# Patient Record
Sex: FEMALE | Race: WHITE
[De-identification: names, ages, dates, MRNs, and addresses within clinical notes are randomized per-mention and may not be internally consistent; named-entity substitution may affect disease eponyms.]

---

## 2017-08-12 ENCOUNTER — HOSPITAL ENCOUNTER (EMERGENCY)
Dept: HOSPITAL 11 - JP.ED | Age: 27
Discharge: NURSING, REHAB OR LONG-TERM CARE | End: 2017-08-12
Payer: COMMERCIAL

## 2017-08-12 VITALS — DIASTOLIC BLOOD PRESSURE: 68 MMHG | SYSTOLIC BLOOD PRESSURE: 115 MMHG
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
## 2017-08-12 DIAGNOSIS — S80.01XA: ICD-10-CM

## 2017-08-12 DIAGNOSIS — S02.119A: ICD-10-CM

## 2017-08-12 DIAGNOSIS — S06.5X1A: Primary | ICD-10-CM

## 2017-08-12 PROCEDURE — 96375 TX/PRO/DX INJ NEW DRUG ADDON: CPT

## 2017-08-12 PROCEDURE — 84703 CHORIONIC GONADOTROPIN ASSAY: CPT

## 2017-08-12 PROCEDURE — 72125 CT NECK SPINE W/O DYE: CPT

## 2017-08-12 PROCEDURE — 87086 URINE CULTURE/COLONY COUNT: CPT

## 2017-08-12 PROCEDURE — 71250 CT THORAX DX C-: CPT

## 2017-08-12 PROCEDURE — 36415 COLL VENOUS BLD VENIPUNCTURE: CPT

## 2017-08-12 PROCEDURE — 99285 EMERGENCY DEPT VISIT HI MDM: CPT

## 2017-08-12 PROCEDURE — 73030 X-RAY EXAM OF SHOULDER: CPT

## 2017-08-12 PROCEDURE — 85025 COMPLETE CBC W/AUTO DIFF WBC: CPT

## 2017-08-12 PROCEDURE — 81001 URINALYSIS AUTO W/SCOPE: CPT

## 2017-08-12 PROCEDURE — 70450 CT HEAD/BRAIN W/O DYE: CPT

## 2017-08-12 PROCEDURE — 96374 THER/PROPH/DIAG INJ IV PUSH: CPT

## 2017-08-12 PROCEDURE — 73564 X-RAY EXAM KNEE 4 OR MORE: CPT

## 2017-08-12 PROCEDURE — 80053 COMPREHEN METABOLIC PANEL: CPT
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
## 2017-08-14 NOTE — CR
Knee Min 4V Rt

 

INDICATION:    pain in rt knee. 

 

COMPARISON:    None

 

FINDINGS:   4 views.  No fracture, dislocation, or other acute bony abnormality.  No joint space johny
rowing.

 

IMPRESSION: Negative study.

## 2017-08-14 NOTE — CR
Shoulder Comp Lt

 

INDICATION:    pain in left clavicle area. 

 

COMPARISON:    None

 

FINDINGS:   3 views.  No fracture, dislocation, or other acute bony abnormality.  No joint space johny
rowing.

 

IMPRESSION: Negative study.

## 2019-01-18 ENCOUNTER — HOSPITAL ENCOUNTER (INPATIENT)
Dept: HOSPITAL 11 - JP.MS | Age: 29
LOS: 2 days | Discharge: HOME | End: 2019-01-20
Attending: ADVANCED PRACTICE MIDWIFE | Admitting: ADVANCED PRACTICE MIDWIFE
Payer: MEDICAID

## 2019-01-18 DIAGNOSIS — Z3A.38: ICD-10-CM

## 2019-01-18 NOTE — PCM.LDHP
L&D History of Present Illness





- General


Date of Service: 19


Admit Problem/Dx: 


 Patient Status Order with Admit Dx/Problem





19 19:16


Admission Diagnosis [ADT] Routine 





19 21:27


Patient Status [ADT] Routine 








 Admission Diagnosis/Problem











Admission Diagnosis/Problem    Pregnancy

















- Related Data


Allergies/Adverse Reactions: 


 Allergies











Allergy/AdvReac Type Severity Reaction Status Date / Time


 


No Known Allergies Allergy   Verified 16 15:07











Home Medications: 


 Home Meds





Omeprazole Magnesium 20 mg PO DAILY 19 [History]


PNV95/Ferrous Fumarate/FA [Prenatal Tablet] 1 tab DAILY 19 [History]











Past Medical History





- Past Health History


Medical/Surgical History: Denies Medical/Surgical History


HEENT History: Reports: None


Cardiovascular History: Reports: None


Respiratory History: Reports: None


Gastrointestinal History: Reports: None


Genitourinary History: Reports: None


OB/GYN History: Reports: Pregnancy


Musculoskeletal History: Reports: Fracture


Other Musculoskeletal History: foot


Neurological History: Reports: Concussion


Psychiatric History: Reports: None


Endocrine/Metabolic History: Reports: None


Hematologic History: Reports: None


Immunologic History: Reports: None


Oncologic (Cancer) History: Reports: None


Dermatologic History: Reports: None





- Past Surgical History


Head Surgeries/Procedures: Reports: None





Social & Family History





- Family History


OBGYN: Reports: Other (See Below)


Other OBGYN Family History: Mother had cervical cancer





- Tobacco Use


Smoking Status *Q: Never Smoker


Second Hand Smoke Exposure: No





- Caffeine Use


Caffeine Use: Reports: None





- Recreational Drug Use


Recreational Drug Use: No





H&P Review of Systems





- Review of Systems:


Review Of Systems: See Below


General: Reports: No Symptoms


HEENT: Reports: No Symptoms


Pulmonary: Reports: No Symptoms


Cardiovascular: Reports: No Symptoms


Gastrointestinal: Reports: No Symptoms


Genitourinary: Reports: No Symptoms


Musculoskeletal: Reports: No Symptoms


Skin: Reports: No Symptoms


Psychiatric: Reports: No Symptoms


Neurological: Reports: No Symptoms


Hematologic/Lymphatic: Reports: No Symptoms


Immunologic: Reports: No Symptoms





L&D Exam





- Exam


Exam: See Below





- Vital Signs


Vital Signs: 


 Last Vital Signs











Temp  36.7 C   19 21:27


 


Pulse  80   19 21:27


 


Resp  18   19 21:27


 


BP  122/79   19 21:27


 


Pulse Ox      











Weight: 99.79 kg





- OB Specific


Contraction Frequency (min): none


Fetal Heart Rate (FHR) Variability: Moderate (6-25 bmp)


Birth Presentation: Vertex





- Patient Data


Lab Results Last 24 hrs: 


 Laboratory Results - last 24 hr











  19 Range/Units





  19:15 19:15 21:29 


 


WBC    9.9  (4.5-11.0)  K/uL


 


RBC    4.32  (3.30-5.50)  M/uL


 


Hgb    12.6  (12.0-15.0)  g/dL


 


Hct    38.5  (36.0-48.0)  %


 


MCV    89  (80-98)  fL


 


MCH    29  (27-31)  pg


 


MCHC    33  (32-36)  %


 


Plt Count    197  (150-400)  K/uL


 


Neut % (Auto)    66  (36-66)  %


 


Lymph % (Auto)    21 L  (24-44)  %


 


Mono % (Auto)    12 H  (2-6)  %


 


Eos % (Auto)    1 L  (2-4)  %


 


Baso % (Auto)    0  (0-1)  %


 


Sodium     (140-148)  mmol/L


 


Potassium     (3.6-5.2)  mmol/L


 


Chloride     (100-108)  mmol/L


 


Carbon Dioxide     (21-32)  mmol/L


 


Anion Gap     (5.0-14.0)  mmol/L


 


BUN     (7-18)  mg/dL


 


Creatinine     (0.6-1.0)  mg/dL


 


Est Cr Clr Drug Dosing     mL/min


 


Estimated GFR (MDRD)     (>60)  


 


Glucose     ()  mg/dL


 


Uric Acid     (2.6-6.2)  mg/dL


 


Calcium     (8.5-10.1)  mg/dL


 


Total Bilirubin     (0.2-1.0)  mg/dL


 


AST     (15-37)  U/L


 


ALT     (12-78)  U/L


 


Alkaline Phosphatase     ()  U/L


 


Lactate Dehydrogenase     ()  U/L


 


Total Protein     (6.4-8.2)  g/dL


 


Albumin     (3.4-5.0)  g/dL


 


Globulin     (2.3-3.5)  g/dL


 


Albumin/Globulin Ratio     (1.2-2.2)  


 


Urine Color  Yellow    


 


Urine Appearance  Slightly cloudy    


 


Urine pH  7.0    (4.5-8.0)  


 


Ur Specific Gravity  1.020    (1.008-1.030)  


 


Urine Protein  Trace    (NEGATIVE)  mg/dL


 


Urine Glucose (UA)  Normal    (NEGATIVE)  mg/dL


 


Urine Ketones  Negative    (NEGATIVE)  mg/dL


 


Urine Occult Blood  Moderate    (NEGATIVE)  


 


Urine Nitrite  Negative    (NEGATIVE)  


 


Urine Bilirubin  Negative    (NEGATIVE)  


 


Urine Urobilinogen  Normal    (NORMAL)  mg/dL


 


Ur Leukocyte Esterase  Moderate    (NEGATIVE)  


 


Urine RBC  10-20 H    (0-5)  


 


Urine WBC  20-30 H    (0-5)  


 


Ur Epithelial Cells  Many    


 


Amorphous Sediment  Not seen    


 


Urine Bacteria  Many    


 


Urine Mucus  Not seen    


 


Urine Opiates Screen   Negative   (NEGATIVE)  


 


Ur Oxycodone Screen   Negative   (NEGATIVE)  


 


Urine Methadone Screen   Negative   (NEGATIVE)  


 


Ur Propoxyphene Screen   Negative   (NEGATIVE)  


 


Ur Barbiturates Screen   Negative   (NEGATIVE)  


 


Ur Tricyclics Screen   Negative   (NEGATIVE)  


 


Ur Phencyclidine Scrn   Negative   (NEGATIVE)  


 


Ur Amphetamine Screen   Negative   (NEGATIVE)  


 


U Methamphetamines Scrn   Negative   (NEGATIVE)  


 


Urine MDMA Screen   Negative   (NEGATIVE)  


 


U Benzodiazepines Scrn   Negative   (NEGATIVE)  


 


U Cocaine Metab Screen   Negative   (NEGATIVE)  


 


U Marijuana (THC) Screen   Negative   (NEGATIVE)  














  19 Range/Units





  21:29 21:29 


 


WBC    (4.5-11.0)  K/uL


 


RBC    (3.30-5.50)  M/uL


 


Hgb    (12.0-15.0)  g/dL


 


Hct    (36.0-48.0)  %


 


MCV    (80-98)  fL


 


MCH    (27-31)  pg


 


MCHC    (32-36)  %


 


Plt Count    (150-400)  K/uL


 


Neut % (Auto)    (36-66)  %


 


Lymph % (Auto)    (24-44)  %


 


Mono % (Auto)    (2-6)  %


 


Eos % (Auto)    (2-4)  %


 


Baso % (Auto)    (0-1)  %


 


Sodium  137 L   (140-148)  mmol/L


 


Potassium  4.1   (3.6-5.2)  mmol/L


 


Chloride  104   (100-108)  mmol/L


 


Carbon Dioxide  23   (21-32)  mmol/L


 


Anion Gap  14.1 H   (5.0-14.0)  mmol/L


 


BUN  13   (7-18)  mg/dL


 


Creatinine  0.7   (0.6-1.0)  mg/dL


 


Est Cr Clr Drug Dosing  117.39   mL/min


 


Estimated GFR (MDRD)  > 60   (>60)  


 


Glucose  89   ()  mg/dL


 


Uric Acid   5.2  (2.6-6.2)  mg/dL


 


Calcium  9.2   (8.5-10.1)  mg/dL


 


Total Bilirubin  0.2   (0.2-1.0)  mg/dL


 


AST  16   (15-37)  U/L


 


ALT  20   (12-78)  U/L


 


Alkaline Phosphatase  142 H   ()  U/L


 


Lactate Dehydrogenase  136   ()  U/L


 


Total Protein  6.4   (6.4-8.2)  g/dL


 


Albumin  2.0 L   (3.4-5.0)  g/dL


 


Globulin  4.4 H   (2.3-3.5)  g/dL


 


Albumin/Globulin Ratio  0.5 L   (1.2-2.2)  


 


Urine Color    


 


Urine Appearance    


 


Urine pH    (4.5-8.0)  


 


Ur Specific Gravity    (1.008-1.030)  


 


Urine Protein    (NEGATIVE)  mg/dL


 


Urine Glucose (UA)    (NEGATIVE)  mg/dL


 


Urine Ketones    (NEGATIVE)  mg/dL


 


Urine Occult Blood    (NEGATIVE)  


 


Urine Nitrite    (NEGATIVE)  


 


Urine Bilirubin    (NEGATIVE)  


 


Urine Urobilinogen    (NORMAL)  mg/dL


 


Ur Leukocyte Esterase    (NEGATIVE)  


 


Urine RBC    (0-5)  


 


Urine WBC    (0-5)  


 


Ur Epithelial Cells    


 


Amorphous Sediment    


 


Urine Bacteria    


 


Urine Mucus    


 


Urine Opiates Screen    (NEGATIVE)  


 


Ur Oxycodone Screen    (NEGATIVE)  


 


Urine Methadone Screen    (NEGATIVE)  


 


Ur Propoxyphene Screen    (NEGATIVE)  


 


Ur Barbiturates Screen    (NEGATIVE)  


 


Ur Tricyclics Screen    (NEGATIVE)  


 


Ur Phencyclidine Scrn    (NEGATIVE)  


 


Ur Amphetamine Screen    (NEGATIVE)  


 


U Methamphetamines Scrn    (NEGATIVE)  


 


Urine MDMA Screen    (NEGATIVE)  


 


U Benzodiazepines Scrn    (NEGATIVE)  


 


U Cocaine Metab Screen    (NEGATIVE)  


 


U Marijuana (THC) Screen    (NEGATIVE)  











Result Diagrams: 


 19 21:29





 19 21:29





- Problem List


(1) Pregnancy


SNOMED Code(s): 73727794


   ICD Code: Z34.90 - ENCNTR FOR SUPRVSN OF NORMAL PREGNANCY, UNSP, UNSP 

TRIMESTER   Status: Acute   Current Visit: Yes   


Qualifiers: 


   Weeks of gestation: 38 weeks   Qualified Code(s): Z3A.38 - 38 weeks 

gestation of pregnancy   





(2) Preeclampsia


SNOMED Code(s): 411976404


   ICD Code: O14.90 - UNSPECIFIED PRE-ECLAMPSIA, UNSPECIFIED TRIMESTER   Status

: Acute   Current Visit: Yes   


Qualifiers: 


   Trimester: third trimester   Qualified Code(s): O14.93 - Unspecified pre-

eclampsia, third trimester   





(3) Proteinuria


SNOMED Code(s): 96955406


   ICD Code: R80.9 - PROTEINURIA, UNSPECIFIED   Status: Acute   Current Visit: 

Yes   


Qualifiers: 


   Proteinuria type: gestational   Trimester: third trimester   Qualified Code(s

): O12.13 - Gestational proteinuria, third trimester   





(4) Encounter for induction of labor


SNOMED Code(s): 853919971


   ICD Code: Z34.90 - ENCNTR FOR SUPRVSN OF NORMAL PREGNANCY, UNSP, UNSP 

TRIMESTER   Status: Acute   Current Visit: Yes   


Problem List Initiated/Reviewed/Updated: Yes


Orders Last 24hrs: 


 Active Orders 24 hr











 Category Date Time Status


 


 Admission Diagnosis [ADT] Routine ADT  19 19:16 Ordered


 


 Patient Status [ADT] Routine ADT  19 21:27 Active


 


 Ambulate [RC] PER UNIT ROUTINE Care  19 21:29 Active


 


 Communication Order [RC] ASDIRECTED Care  19 21:27 Active


 


 Communication Order [RC] ASDIRECTED Care  19 21:29 Active


 


 Notify Provider Vital Signs [RC] PRN Care  19 21:29 Active


 


 Notify Provider [RC] PRN Care  19 21:27 Active


 


 Up ad Katrin [RC] ASDIRECTED Care  19 21:26 Active


 


 VTE/DVT Education [RC] Click to Edit Care  19 21:30 Active


 


 Vital Signs [RC] PER UNIT ROUTINE Care  19 21:27 Active


 


 Regular Diet [DIET] Diet  19 Dinner Active


 


 Acetaminophen [Tylenol] Med  19 21:29 Active





 650 mg PO Q4H PRN   


 


 Lactated Ringers [Ringers, Lactated] 1,000 ml Med  19 21:45 Active





 IV ASDIRECTED   


 


 Ondansetron [Zofran] Med  19 21:29 Active





 4 mg IV Q4H PRN   


 


 Sodium Chloride 0.9% [Saline Flush] Med  19 21:26 Active





 10 ml FLUSH ASDIRECTED PRN   


 


 fentaNYL [Sublimaze] Med  19 21:29 Active





 100 mcg IVPUSH Q1H PRN   


 


 DVT/VTE Prophylaxis Reflex [OM.PC] Routine Oth  19 21:29 Ordered


 


 Saline Lock Insert [OM.PC] Routine Oth  19 21:27 Ordered


 


 Saline Lock Insert [OM.PC] Routine Oth  19 21:29 Ordered


 


 Resuscitation Status Routine Resus Stat  19 21:26 Ordered








 Medication Orders





Acetaminophen (Tylenol)  650 mg PO Q4H PRN


   PRN Reason: Pain (Mild 1-3) and fever


Fentanyl (Sublimaze)  100 mcg IVPUSH Q1H PRN


   PRN Reason: Pain (moderate 4-6)


Lactated Ringer's (Ringers, Lactated)  1,000 mls @ 125 mls/hr IV ASDIRECTED DAVEY


Ondansetron HCl (Zofran)  4 mg IV Q4H PRN


   PRN Reason: Nausea/Vomiting


Sodium Chloride (Saline Flush)  10 ml FLUSH ASDIRECTED PRN


   PRN Reason: Keep Vein Open








Assessment/Plan Comment:: 





2019


29 yo  here at 38 5/7 for a medical induction due to preelampsia


SVE-1/50/-2


FHTs category one


No contractions noted 


Cytotec 50mcg vaginally





Plan-


Monitor for active labor


Monitor FHTs


CBC, CMP, LDH, uric acid today


Pain medication per patient request


Plan and anticipate vaginal delivery


Ambien 10mg 


Intermittent monitoring

## 2019-01-19 PROCEDURE — 3E033VJ INTRODUCTION OF OTHER HORMONE INTO PERIPHERAL VEIN, PERCUTANEOUS APPROACH: ICD-10-PCS

## 2019-01-19 PROCEDURE — 3E0P7VZ INTRODUCTION OF HORMONE INTO FEMALE REPRODUCTIVE, VIA NATURAL OR ARTIFICIAL OPENING: ICD-10-PCS | Performed by: ADVANCED PRACTICE MIDWIFE
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
## 2019-01-19 NOTE — PCM.DEL
L & D Note





- General Info


Date of Service: 19





- Delivery Note


Cervical Ripening Method: Misoprostil


Delivery Outcome: Livebirth


Infant Delivery Method: Spontaneous Vaginal Delivery-Single


Infant Delivery Mode: Spontaneous


Birth Presentation: Vertex


Nuchal Cord: None


Anesthesia Type: Epidural


Episiotomy Type: None


Laceration: None


Placenta: Intact, Spontaneous


Cord: 3 Vessels


Estimated Blood Loss: 250


Resuscitation Needed: No


[place]: Bulb Syringe, Stimulated, Warmed, [place] Used


Second Stage Interventions: Reports: Encouragement Given, Pushing Effectively, 

Pushing, McRobert's Position


Delivery Comments (Free Text/Narrative):: 





2019


29 yo  delivered a viable male infant at 38 6/7 gestational weeks at 0719 

on 2019 in JANEEN position over an intact perineum.  Infant delivered and 

placed on prewarmed blanket on mother's abdomen, cord then double clamped after 

delayed cord clamping and cut by father of infant.  Infant dried, stimulated, 

and warmed.  Infant began to cry vigorously and pink in color.  APGARS-9/9, 

weight-7lbs 10.4oz, length-19.8 inches, Placenta spontaneous, EBL-250ml, 3 

vessel cord.  No lacerations noted of the cervix, vagina, perineum, or rectum.  

Infant now skin to skin with mother and both stable.





Stages-


[id number]


[id number]


[id number]





- General Info


Date of Service: 19


Functional Status: Reports: Pain Controlled





- Review of Systems


General: Reports: No Symptoms


HEENT: Reports: No Symptoms


Pulmonary: Reports: No Symptoms


Cardiovascular: Reports: No Symptoms


Gastrointestinal: Reports: No Symptoms


Genitourinary: Reports: No Symptoms


Musculoskeletal: Reports: No Symptoms


Skin: Reports: No Symptoms


Neurological: Reports: No Symptoms


Psychiatric: Reports: No Symptoms





- Patient Data


Vitals - Most Recent: 


 Last Vital Signs











Temp  37.1 C   19 06:30


 


Pulse  78   19 06:45


 


Resp  20   19 06:45


 


BP  142/78 H  19 06:45


 


Pulse Ox  97   19 06:45











Weight - Most Recent: 99.79 kg


Lab Results Last 24 Hours: 


 Laboratory Results - last 24 hr











  19 Range/Units





  19:15 19:15 21:29 


 


WBC    9.9  (4.5-11.0)  K/uL


 


RBC    4.32  (3.30-5.50)  M/uL


 


Hgb    12.6  (12.0-15.0)  g/dL


 


Hct    38.5  (36.0-48.0)  %


 


MCV    89  (80-98)  fL


 


MCH    29  (27-31)  pg


 


MCHC    33  (32-36)  %


 


Plt Count    197  (150-400)  K/uL


 


Neut % (Auto)    66  (36-66)  %


 


Lymph % (Auto)    21 L  (24-44)  %


 


Mono % (Auto)    12 H  (2-6)  %


 


Eos % (Auto)    1 L  (2-4)  %


 


Baso % (Auto)    0  (0-1)  %


 


Sodium     (140-148)  mmol/L


 


Potassium     (3.6-5.2)  mmol/L


 


Chloride     (100-108)  mmol/L


 


Carbon Dioxide     (21-32)  mmol/L


 


Anion Gap     (5.0-14.0)  mmol/L


 


BUN     (7-18)  mg/dL


 


Creatinine     (0.6-1.0)  mg/dL


 


Est Cr Clr Drug Dosing     mL/min


 


Estimated GFR (MDRD)     (>60)  


 


Glucose     ()  mg/dL


 


Uric Acid     (2.6-6.2)  mg/dL


 


Calcium     (8.5-10.1)  mg/dL


 


Total Bilirubin     (0.2-1.0)  mg/dL


 


AST     (15-37)  U/L


 


ALT     (12-78)  U/L


 


Alkaline Phosphatase     ()  U/L


 


Lactate Dehydrogenase     ()  U/L


 


Total Protein     (6.4-8.2)  g/dL


 


Albumin     (3.4-5.0)  g/dL


 


Globulin     (2.3-3.5)  g/dL


 


Albumin/Globulin Ratio     (1.2-2.2)  


 


Urine Color  Yellow    


 


Urine Appearance  Slightly cloudy    


 


Urine pH  7.0    (4.5-8.0)  


 


Ur Specific Gravity  1.020    (1.008-1.030)  


 


Urine Protein  Trace    (NEGATIVE)  mg/dL


 


Urine Glucose (UA)  Normal    (NEGATIVE)  mg/dL


 


Urine Ketones  Negative    (NEGATIVE)  mg/dL


 


Urine Occult Blood  Moderate    (NEGATIVE)  


 


Urine Nitrite  Negative    (NEGATIVE)  


 


Urine Bilirubin  Negative    (NEGATIVE)  


 


Urine Urobilinogen  Normal    (NORMAL)  mg/dL


 


Ur Leukocyte Esterase  Moderate    (NEGATIVE)  


 


Urine RBC  10-20 H    (0-5)  


 


Urine WBC  20-30 H    (0-5)  


 


Ur Epithelial Cells  Many    


 


Amorphous Sediment  Not seen    


 


Urine Bacteria  Many    


 


Urine Mucus  Not seen    


 


Urine Opiates Screen   Negative   (NEGATIVE)  


 


Ur Oxycodone Screen   Negative   (NEGATIVE)  


 


Urine Methadone Screen   Negative   (NEGATIVE)  


 


Ur Propoxyphene Screen   Negative   (NEGATIVE)  


 


Ur Barbiturates Screen   Negative   (NEGATIVE)  


 


Ur Tricyclics Screen   Negative   (NEGATIVE)  


 


Ur Phencyclidine Scrn   Negative   (NEGATIVE)  


 


Ur Amphetamine Screen   Negative   (NEGATIVE)  


 


U Methamphetamines Scrn   Negative   (NEGATIVE)  


 


Urine MDMA Screen   Negative   (NEGATIVE)  


 


U Benzodiazepines Scrn   Negative   (NEGATIVE)  


 


U Cocaine Metab Screen   Negative   (NEGATIVE)  


 


U Marijuana (THC) Screen   Negative   (NEGATIVE)  














  19 Range/Units





  21:29 21:29 


 


WBC    (4.5-11.0)  K/uL


 


RBC    (3.30-5.50)  M/uL


 


Hgb    (12.0-15.0)  g/dL


 


Hct    (36.0-48.0)  %


 


MCV    (80-98)  fL


 


MCH    (27-31)  pg


 


MCHC    (32-36)  %


 


Plt Count    (150-400)  K/uL


 


Neut % (Auto)    (36-66)  %


 


Lymph % (Auto)    (24-44)  %


 


Mono % (Auto)    (2-6)  %


 


Eos % (Auto)    (2-4)  %


 


Baso % (Auto)    (0-1)  %


 


Sodium  137 L   (140-148)  mmol/L


 


Potassium  4.1   (3.6-5.2)  mmol/L


 


Chloride  104   (100-108)  mmol/L


 


Carbon Dioxide  23   (21-32)  mmol/L


 


Anion Gap  14.1 H   (5.0-14.0)  mmol/L


 


BUN  13   (7-18)  mg/dL


 


Creatinine  0.7   (0.6-1.0)  mg/dL


 


Est Cr Clr Drug Dosing  117.39   mL/min


 


Estimated GFR (MDRD)  > 60   (>60)  


 


Glucose  89   ()  mg/dL


 


Uric Acid   5.2  (2.6-6.2)  mg/dL


 


Calcium  9.2   (8.5-10.1)  mg/dL


 


Total Bilirubin  0.2   (0.2-1.0)  mg/dL


 


AST  16   (15-37)  U/L


 


ALT  20   (12-78)  U/L


 


Alkaline Phosphatase  142 H   ()  U/L


 


Lactate Dehydrogenase  136   ()  U/L


 


Total Protein  6.4   (6.4-8.2)  g/dL


 


Albumin  2.0 L   (3.4-5.0)  g/dL


 


Globulin  4.4 H   (2.3-3.5)  g/dL


 


Albumin/Globulin Ratio  0.5 L   (1.2-2.2)  


 


Urine Color    


 


Urine Appearance    


 


Urine pH    (4.5-8.0)  


 


Ur Specific Gravity    (1.008-1.030)  


 


Urine Protein    (NEGATIVE)  mg/dL


 


Urine Glucose (UA)    (NEGATIVE)  mg/dL


 


Urine Ketones    (NEGATIVE)  mg/dL


 


Urine Occult Blood    (NEGATIVE)  


 


Urine Nitrite    (NEGATIVE)  


 


Urine Bilirubin    (NEGATIVE)  


 


Urine Urobilinogen    (NORMAL)  mg/dL


 


Ur Leukocyte Esterase    (NEGATIVE)  


 


Urine RBC    (0-5)  


 


Urine WBC    (0-5)  


 


Ur Epithelial Cells    


 


Amorphous Sediment    


 


Urine Bacteria    


 


Urine Mucus    


 


Urine Opiates Screen    (NEGATIVE)  


 


Ur Oxycodone Screen    (NEGATIVE)  


 


Urine Methadone Screen    (NEGATIVE)  


 


Ur Propoxyphene Screen    (NEGATIVE)  


 


Ur Barbiturates Screen    (NEGATIVE)  


 


Ur Tricyclics Screen    (NEGATIVE)  


 


Ur Phencyclidine Scrn    (NEGATIVE)  


 


Ur Amphetamine Screen    (NEGATIVE)  


 


U Methamphetamines Scrn    (NEGATIVE)  


 


Urine MDMA Screen    (NEGATIVE)  


 


U Benzodiazepines Scrn    (NEGATIVE)  


 


U Cocaine Metab Screen    (NEGATIVE)  


 


U Marijuana (THC) Screen    (NEGATIVE)  











Med Orders - Current: 


 Current Medications





Acetaminophen (Tylenol)  650 mg PO Q4H PRN


   PRN Reason: Pain (Mild 1-3) and fever


Acetaminophen (Tylenol Bulk Bottle)  325 - 650 mg PO Q4H PRN


   PRN Reason: Pain


Benzocaine (Vxnq-D-Affkrtq 20% Spray)  0 gm TOP Q4H PRN


   PRN Reason: Perineal Comfort Measure


Docusate Sodium (Colace)  100 mg PO BID PRN


   PRN Reason: Constipation


Emollient Ointment (Lansinoh Hpa)  0 gm TOP ASDIRECTED PRN


   PRN Reason: Sore Nipples


Fentanyl (Sublimaze)  100 mcg IVPUSH Q1H PRN


   PRN Reason: Pain (moderate 4-6)


Lactated Ringer's (Ringers, Lactated)  1,000 mls @ 125 mls/hr IV ASDIRECTED DAVEY


   Last Admin: 19 05:38 Dose:  125 mls/hr


Ibuprofen (Motrin Bulk Bottle)  600 mg PO Q6H PRN


   PRN Reason: Pain


Ondansetron HCl (Zofran)  4 mg IV Q4H PRN


   PRN Reason: Nausea/Vomiting


Sodium Chloride (Saline Flush)  10 ml FLUSH ASDIRECTED PRN


   PRN Reason: Keep Vein Open


Witch Hazel (Tucks)  1 pad TOP ASDIRECTED PRN


   PRN Reason: Hemorrhoids





Discontinued Medications





Ephedrine Sulfate (Ephedrine Sulfate) Confirm Administered Dose 50 mg .ROUTE 

.STK-MED ONE


   Stop: 19 05:33


Lactated Ringer's (Ringers, Lactated)  1,000 mls @ 999 mls/hr IV BOLUS ONE


   Stop: 19 06:38


   Last Admin: 19 06:03 Dose:  999 mls/hr


Ropivacaine (Naropin 0.2%) Confirm Administered Dose 100 mls @ as directed 

.ROUTE .STK-MED ONE


   Stop: 19 06:43


Oxytocin/Sodium Chloride (Pitocin In Ns 20 Units/1,000 Ml)  20 unit in 1,000 

mls @ 2,997 mls/hr IV ONETIME ONE; Protocol


   Stop: 19 07:56


Misoprostol (Cytotec)  50 mcg VAG ONETIME ONE


   Stop: 19 21:22


   Last Admin: 19 21:47 Dose:  50 mcg


Zolpidem Tartrate (Ambien)  10 mg PO ONETIME ONE


   Stop: 19 21:32


   Last Admin: 19 02:14 Dose:  Not Given











- Exam


General: Alert, Oriented


HEENT: Pupils Equal, Pupils Reactive, EOMI, Mucous Membr. Moist/Pink


Neck: Supple


Lungs: Clear to Auscultation, Normal Respiratory Effort


Cardiovascular: Regular Rate, Regular Rhythm


GI/Abdominal Exam: Normal Bowel Sounds, Soft, Non-Tender, No Organomegaly, No 

Distention, No Abnormal Bruit, No Mass, Pelvis Stable


 (Female) Exam: Normal External Exam


Back Exam: Normal Inspection, Full Range of Motion


Extremities: Normal Inspection, Normal Range of Motion, Non-Tender, No Pedal 

Edema, Normal Capillary Refill


Skin: Warm, Dry, Intact


Neurological: No New Focal Deficit


Psy/Mental Status: Alert, Normal Affect, Normal Mood





- Problem List & Annotations


(1) Pregnancy


SNOMED Code(s): 41106396


   Code(s): Z34.90 - ENCNTR FOR SUPRVSN OF NORMAL PREGNANCY, UNSP, UNSP 

TRIMESTER   Status: Acute   Current Visit: Yes   


Qualifiers: 


   Weeks of gestation: 38 weeks   Qualified Code(s): Z3A.38 - 38 weeks 

gestation of pregnancy   





(2) Preeclampsia


SNOMED Code(s): 919484188


   Code(s): O14.90 - UNSPECIFIED PRE-ECLAMPSIA, UNSPECIFIED TRIMESTER   Status: 

Acute   Current Visit: Yes   


Qualifiers: 


   Trimester: third trimester   Qualified Code(s): O14.93 - Unspecified pre-

eclampsia, third trimester   





(3) Proteinuria


SNOMED Code(s): 90942706


   Code(s): R80.9 - PROTEINURIA, UNSPECIFIED   Status: Acute   Current Visit: 

Yes   


Qualifiers: 


   Proteinuria type: gestational   Trimester: third trimester   Qualified Code(s

): O12.13 - Gestational proteinuria, third trimester   





(4) Encounter for induction of labor


SNOMED Code(s): 257879851


   Code(s): Z34.90 - ENCNTR FOR SUPRVSN OF NORMAL PREGNANCY, UNSP, UNSP 

TRIMESTER   Status: Acute   Current Visit: Yes   





(5) Normal vaginal delivery


SNOMED Code(s): 50589631


   Code(s): O80 - ENCOUNTER FOR FULL-TERM UNCOMPLICATED DELIVERY   Status: 

Acute   Current Visit: Yes   





(6) Breastfeeding (infant)


SNOMED Code(s): 707915466


   Code(s): Z78.9 - OTHER SPECIFIED HEALTH STATUS   Status: Acute   Current 

Visit: Yes   





- Problem List Review


Problem List Initiated/Reviewed/Updated: Yes





- My Orders


Last 24 Hours: 


My Active Orders





19 19:16


Admission Diagnosis [ADT] Routine 





19 21:26


Up ad [place] [RC] ASDIRECTED 


Sodium Chloride 0.9% [Saline Flush]   10 ml FLUSH ASDIRECTED PRN 


Resuscitation Status Routine 





19 21:27


Patient Status [ADT] Routine 


Communication Order [RC] ASDIRECTED 


Notify Provider [RC] PRN 


Vital Signs [RC] PER UNIT ROUTINE 


Saline Lock Insert [OM.PC] Routine 





19 21:29


Ambulate [RC] PER UNIT ROUTINE 


Communication Order [RC] ASDIRECTED 


Notify Provider Vital Signs [RC] PRN 


Acetaminophen [Tylenol]   650 mg PO Q4H PRN 


Ondansetron [Zofran]   4 mg IV Q4H PRN 


fentaNYL [Sublimaze]   100 mcg IVPUSH Q1H PRN 


DVT/VTE Prophylaxis Reflex [OM.PC] Routine 


Saline Lock Insert [OM.PC] Routine 





19 21:30


VTE/DVT Education [RC] Click to Edit 





19 21:45


Lactated Ringers [Ringers, Lactated] 1,000 ml IV ASDIRECTED 





19 Dinner


Regular Diet [DIET] 





19 07:32


Acetaminophen [Tylenol Bulk Bottle]   325 - 650 mg PO Q4H PRN 


Benzocaine [Qovk-D-Nuqrxyi 20% Spray]   See Dose Instructions  TOP Q4H PRN 


Docusate Sodium [Colace]   100 mg PO BID PRN 


Ibuprofen [Motrin Bulk Bottle]   600 mg PO Q6H PRN 


Lanolin [Lansinoh HPA]   0 gm TOP ASDIRECTED PRN 


Witch Hazel [Tucks]   1 pad TOP ASDIRECTED PRN 


Assess Lochia [WOMSER] Per Unit Routine 


Assess Uterine Involution [WOMSER] Per Unit Routine 





19 07:33


Patient Status [ADT] Routine 


Vital Signs [RC] PFP 


Perineal Care [OM.PC] Per Unit Routine 





19 07:32


CBC WITH AUTO DIFF [HEME] Routine 














- Assessment


Assessment:: 





2019


29 yo G3 now P2  without complications at 0719 on 2019


Breastfeeding


Preeclampsia with proteinuria


Labs-O positive, Hep B neg, Hep C neg, HIV neg, RPR nonreactive, GBS neg

















- Plan


Plan:: 





2019


29 yo  here at 38 5/7 for a medical induction due to preelampsia


SVE-1/50/-2


FHTs category one


No contractions noted 


Cytotec 50mcg vaginally





Plan-


Monitor for active labor


Monitor FHTs


CBC, CMP, LDH, uric acid today


Pain medication per patient request


Plan and anticipate vaginal delivery


Ambien 10mg 


Intermittent monitoring


--------------------------------------------------------


2019


Routine postpartum cares


Encourage and support breastfeeding

## 2019-01-20 VITALS — DIASTOLIC BLOOD PRESSURE: 67 MMHG | SYSTOLIC BLOOD PRESSURE: 110 MMHG

## 2019-01-20 NOTE — PCM.PNPP
- General Info


Date of Service: 19


Functional Status: Reports: Pain Controlled





- Review of Systems


General: Reports: No Symptoms


HEENT: Reports: No Symptoms


Pulmonary: Reports: No Symptoms


Cardiovascular: Reports: No Symptoms


Gastrointestinal: Reports: No Symptoms


Genitourinary: Reports: No Symptoms


Musculoskeletal: Reports: No Symptoms


Skin: Reports: No Symptoms


Neurological: Reports: No Symptoms


Psychiatric: Reports: No Symptoms





- General Info


Date of Service: 19





- Patient Data


Vital Signs - Most Recent: 


 Last Vital Signs











Temp  36.7 C   19 03:43


 


Pulse  67   19 03:43


 


Resp  18   19 03:43


 


BP  103/69   19 03:43


 


Pulse Ox  95   19 03:43











Weight - Most Recent: 99.79 kg


I&O - Last 24 Hours: 


 Intake & Output











 19





 14:59 22:59 06:59


 


Intake Total 2290 1500 


 


Balance 2290 1500 











Lab Results - Last 24 Hours: 


 Laboratory Results - last 24 hr











  19 Range/Units





  05:38 


 


WBC  10.0  (4.5-11.0)  K/uL


 


RBC  4.04  (3.30-5.50)  M/uL


 


Hgb  12.0  (12.0-15.0)  g/dL


 


Hct  36.5  (36.0-48.0)  %


 


MCV  90  (80-98)  fL


 


MCH  30  (27-31)  pg


 


MCHC  33  (32-36)  %


 


Plt Count  163  (150-400)  K/uL


 


Neut % (Auto)  68 H  (36-66)  %


 


Lymph % (Auto)  20 L  (24-44)  %


 


Mono % (Auto)  11 H  (2-6)  %


 


Eos % (Auto)  1 L  (2-4)  %


 


Baso % (Auto)  0  (0-1)  %











Med Orders - Current: 


 Current Medications





Acetaminophen (Tylenol)  650 mg PO Q4H PRN


   PRN Reason: Pain (Mild 1-3) and fever


Acetaminophen (Tylenol Bulk Bottle)  325 - 650 mg PO Q4H PRN


   PRN Reason: Pain


   Last Admin: 19 08:20 Dose:  325 mg


Benzocaine (Ghnd-A-Vkadfrk 20% Spray)  0 gm TOP Q4H PRN


   PRN Reason: Perineal Comfort Measure


   Last Admin: 19 08:19 Dose:  1 applic


Docusate Sodium (Colace)  100 mg PO BID PRN


   PRN Reason: Constipation


Emollient Ointment (Lansinoh Hpa)  0 gm TOP ASDIRECTED PRN


   PRN Reason: Sore Nipples


   Last Admin: 19 13:07 Dose:  1 applic


Fentanyl (Sublimaze)  100 mcg IVPUSH Q1H PRN


   PRN Reason: Pain (moderate 4-6)


Lactated Ringer's (Ringers, Lactated)  1,000 mls @ 125 mls/hr IV ASDIRECTED DAVEY


   Last Admin: 19 05:38 Dose:  125 mls/hr


Ibuprofen (Motrin Bulk Bottle)  600 mg PO Q6H PRN


   PRN Reason: Pain


   Last Admin: 19 08:20 Dose:  600 mg


Ondansetron HCl (Zofran)  4 mg IV Q4H PRN


   PRN Reason: Nausea/Vomiting


Sodium Chloride (Saline Flush)  10 ml FLUSH ASDIRECTED PRN


   PRN Reason: Keep Vein Open


Witch Hazel (Tucks)  1 pad TOP ASDIRECTED PRN


   PRN Reason: Hemorrhoids





Discontinued Medications





Ephedrine Sulfate (Ephedrine Sulfate) Confirm Administered Dose 50 mg .ROUTE 

.STK-MED ONE


   Stop: 19 05:33


   Last Admin: 19 10:33 Dose:  Not Given


Lactated Ringer's (Ringers, Lactated)  1,000 mls @ 999 mls/hr IV BOLUS ONE


   Stop: 19 06:38


   Last Admin: 19 06:03 Dose:  999 mls/hr


Ropivacaine (Naropin 0.2%) Confirm Administered Dose 100 mls @ as directed 

.ROUTE .STK-MED ONE


   Stop: 19 06:43


Oxytocin/Sodium Chloride (Pitocin In Ns 20 Units/1,000 Ml)  20 unit in 1,000 

mls @ 2,997 mls/hr IV ONETIME ONE; Protocol


   Stop: 19 07:56


   Last Admin: 19 07:20 Dose:  999 munits/min, 500 mls/hr


Misoprostol (Cytotec)  50 mcg VAG ONETIME ONE


   Stop: 19 21:22


   Last Admin: 19 21:47 Dose:  50 mcg


Zolpidem Tartrate (Ambien)  10 mg PO ONETIME ONE


   Stop: 19 21:32


   Last Admin: 19 02:14 Dose:  Not Given











- Infant Interaction


Infant Disposition, Postpartum: [place] in Room with Family


Infant Interaction: Holding Infant


Infant Feeding:  Infant; Nursed Well


Support Person: Significant Other





- Postpartum Recovery Exam


Fundal Tone: Firm


Fundal Level: 1 Fingerbreadths Below Umbilicus


Fundal Placement: Midline


Lochia Amount: Moderate


Lochia Color: Rubra/Red


Perineum Description: Intact, Minimal Bruising/Swelling


Episiotomy/Laceration: None


Urinary Elimination: Voided





- Exam


General: Alert, Oriented, Cooperative


HEENT: Pupils Equal


Neck: Supple


Lungs: Clear to Auscultation, Normal Respiratory Effort


Cardiovascular: Regular Rate, Regular Rhythm


GI/Abdominal Exam: Normal Bowel Sounds, Soft, Non-Tender, No Organomegaly, No 

Distention, No Abnormal Bruit, No Mass, Pelvis Stable


Extremities: Normal Inspection, Normal Range of Motion, Non-Tender, No Pedal 

Edema, Normal Capillary Refill


Skin: Warm, Dry, Intact


Neurological: No New Focal Deficit


Psy/Mental Status: Alert, Normal Affect, Normal Mood





- Problem List & Annotations


(1) Pregnancy


SNOMED Code(s): 22158712


   Code(s): Z34.90 - ENCNTR FOR SUPRVSN OF NORMAL PREGNANCY, UNSP, UNSP 

TRIMESTER   Status: Acute   Current Visit: Yes   


Qualifiers: 


   Weeks of gestation: 38 weeks   Qualified Code(s): Z3A.38 - 38 weeks 

gestation of pregnancy   





(2) Preeclampsia


SNOMED Code(s): 081136189


   Code(s): O14.90 - UNSPECIFIED PRE-ECLAMPSIA, UNSPECIFIED TRIMESTER   Status: 

Acute   Current Visit: Yes   


Qualifiers: 


   Trimester: third trimester   Qualified Code(s): O14.93 - Unspecified pre-

eclampsia, third trimester   





(3) Proteinuria


SNOMED Code(s): 60911749


   Code(s): R80.9 - PROTEINURIA, UNSPECIFIED   Status: Acute   Current Visit: 

Yes   


Qualifiers: 


   Proteinuria type: gestational   Trimester: third trimester   Qualified Code(s

): O12.13 - Gestational proteinuria, third trimester   





(4) Encounter for induction of labor


SNOMED Code(s): 403278678


   Code(s): Z34.90 - ENCNTR FOR SUPRVSN OF NORMAL PREGNANCY, UNSP, UNSP 

TRIMESTER   Status: Acute   Current Visit: Yes   





(5) Normal vaginal delivery


SNOMED Code(s): 90163143


   Code(s): O80 - ENCOUNTER FOR FULL-TERM UNCOMPLICATED DELIVERY   Status: 

Acute   Current Visit: Yes   





(6) Breastfeeding (infant)


SNOMED Code(s): 582297293


   Code(s): Z78.9 - OTHER SPECIFIED HEALTH STATUS   Status: Acute   Current 

Visit: Yes   





- Problem List Review


Problem List Initiated/Reviewed/Updated: Yes





- My Orders


Last 24 Hours: 


My Active Orders





19 07:32


Acetaminophen [Tylenol Bulk Bottle]   325 - 650 mg PO Q4H PRN 


Benzocaine [Qvup-V-Dnngzlw 20% Spray]   See Dose Instructions  TOP Q4H PRN 


Docusate Sodium [Colace]   100 mg PO BID PRN 


Ibuprofen [Motrin Bulk Bottle]   600 mg PO Q6H PRN 


Lanolin [Lansinoh HPA]   0 gm TOP ASDIRECTED PRN 


Witch Hazel [Tucks]   1 pad TOP ASDIRECTED PRN 


Assess Lochia [WOMSER] Per Unit Routine 


Assess Uterine Involution [WOMSER] Per Unit Routine 





19 07:33


Patient Status [ADT] Routine 


Perineal Care [OM.PC] Per Unit Routine 














- Assessment


Assessment:: 





2019


27 yo G3 now P2  without complications at 0719 on 2019


Breastfeeding


Preeclampsia with proteinuria


Labs-O positive, Hep B neg, Hep C neg, HIV neg, RPR nonreactive, GBS neg


----------------------------------------------------------------------------


2019


 postpartum day one


Voiding and passing gas


Fundus firm and bleeding decreasing


Breastfeeding well


Hgb-12.0

















- Plan


Plan:: 





2019


27 yo  here at 38 5/7 for a medical induction due to preelampsia


SVE-/50/-2


FHTs category one


No contractions noted 


Cytotec 50mcg vaginally





Plan-


Monitor for active labor


Monitor FHTs


CBC, CMP, LDH, uric acid today


Pain medication per patient request


Plan and anticipate vaginal delivery


Ambien 10mg 


Intermittent monitoring


--------------------------------------------------------


2019


Routine postpartum cares


Encourage and support breastfeeding


-------------------------------------------------


2019


Continue routine postpartum cares


Continue to encourage and support breastfeeding


Discharge home today


To see me in the clinic in six weeks for postpartum visit

## 2020-05-02 ENCOUNTER — HOSPITAL ENCOUNTER (EMERGENCY)
Dept: HOSPITAL 11 - JP.ED | Age: 30
Discharge: HOME | End: 2020-05-02
Payer: MEDICAID

## 2020-05-02 VITALS — DIASTOLIC BLOOD PRESSURE: 68 MMHG | SYSTOLIC BLOOD PRESSURE: 111 MMHG | HEART RATE: 74 BPM

## 2020-05-02 DIAGNOSIS — Z3A.01: ICD-10-CM

## 2020-05-02 DIAGNOSIS — O20.0: Primary | ICD-10-CM

## 2020-05-02 DIAGNOSIS — Z79.899: ICD-10-CM

## 2020-05-02 NOTE — EDM.PDOC
ED HPI GENERAL MEDICAL PROBLEM





- General


Chief Complaint: OB/GYN Problem


Stated Complaint: 6 WKS PG/BLEEDING


Time Seen by Provider: 20 20:30


Source of Information: Reports: Patient


History Limitations: Reports: No Limitations





- History of Present Illness


INITIAL COMMENTS - FREE TEXT/NARRATIVE: 





29-year-old female is only 6 weeks gestation and is having some spotting.  No 

significant cramping.  No dysuria.


Associated Symptoms: Reports: No Other Symptoms





- Related Data


 Allergies











Allergy/AdvReac Type Severity Reaction Status Date / Time


 


No Known Allergies Allergy   Verified 20 20:19











Home Meds: 


 Home Meds





Pnv No.95/Ferrous Fum/Folic AC [Prenatal Tablet] 1 tab DAILY 19 [History]











Past Medical History





- Past Health History


Medical/Surgical History: Denies Medical/Surgical History


HEENT History: Reports: None


Cardiovascular History: Reports: None


Respiratory History: Reports: None


Gastrointestinal History: Reports: None


Genitourinary History: Reports: None


OB/GYN History: Reports: Pregnancy


Musculoskeletal History: Reports: Fracture


Other Musculoskeletal History: foot


Neurological History: Reports: Concussion


Psychiatric History: Reports: None


Endocrine/Metabolic History: Reports: None


Hematologic History: Reports: None


Immunologic History: Reports: None


Oncologic (Cancer) History: Reports: None


Dermatologic History: Reports: None





- Past Surgical History


Head Surgeries/Procedures: Reports: None





Social & Family History





- Family History


OBGYN: Reports: Other (See Below)


Other OBGYN Family History: Mother had cervical cancer





- Tobacco Use


Smoking Status *Q: Never Smoker





- Caffeine Use


Caffeine Use: Reports: None





ED ROS GENERAL





- Review of Systems


Review Of Systems: See Below


Constitutional: Denies: Fever, Chills


Respiratory: Reports: No Symptoms


Cardiovascular: Reports: No Symptoms


GI/Abdominal: Reports: Other (Mild lower abdominal cramping)


: Reports: No Symptoms


Skin: Reports: No Symptoms





ED EXAM PREGNANCY





- Physical Exam


Exam: See Below


Exam Limited By: No Limitations


General Appearance: Alert, No Apparent Distress


Respiratory/Chest: No Respiratory Distress


GI/Abdominal Exam: Soft, Non-Tender


Neurological: Alert, Oriented


Psychiatric: Normal Affect, Normal Mood


Skin Exam: Warm, Dry





Course





- Vital Signs


Last Recorded V/S: 


 Last Vital Signs











Temp  97.6 F   20 20:25


 


Pulse  74   20 20:25


 


Resp  14   20 20:25


 


BP  111/68   20 20:25


 


Pulse Ox  98   20 20:25














- Orders/Labs/Meds


Labs: 


 Laboratory Tests











  20 Range/Units





  20:48 


 


HCG, Quant  2628 H  (0-6)  mIU/mL














- Re-Assessments/Exams


Free Text/Narrative Re-Assessment/Exam: 





20 02:22


Quantitative beta-hCG was drawn and is only 2600.  My recommendation was to 

repeat the level in 2 days, she may be ready for an ultrasound at that time.  

If she develops heavier bleeding and pain she is likely miscarrying and can be 

rechecked if concerns regarding symptoms.





Departure





- Departure


Time of Disposition: 22:48


Disposition: Home, Self-Care 01


Clinical Impression: 


 Threatened 








- Discharge Information


Instructions:  Vaginal Bleeding During Pregnancy, First Trimester


Referrals: 


Toña Cedeno CNM [Primary Care Provider] - 


Forms:  ED Department Discharge


Care Plan Goals: 


Recheck on Monday for a second blood test or possible ultrasound, unless 

bleeding and pain increase significantly then no follow-up is necessarily 

needed.  Your beta hCG level was 2600.





Sepsis Event Note





- Evaluation


Sepsis Screening Result: No Definite Risk





- Focused Exam


Vital Signs: 


 Vital Signs











  Temp Pulse Resp BP Pulse Ox


 


 20 20:25  97.6 F  74  14  111/68  98











Date Exam was Performed: 20


Time Exam was Performed: 02:21

## 2021-05-01 ENCOUNTER — HOSPITAL ENCOUNTER (INPATIENT)
Dept: HOSPITAL 11 - JP.OBCHECK | Age: 31
LOS: 1 days | Discharge: HOME | End: 2021-05-02
Attending: ADVANCED PRACTICE MIDWIFE | Admitting: ADVANCED PRACTICE MIDWIFE
Payer: MEDICAID

## 2021-05-01 DIAGNOSIS — Z20.822: ICD-10-CM

## 2021-05-01 DIAGNOSIS — Z3A.39: ICD-10-CM

## 2021-05-01 PROCEDURE — 0HQ9XZZ REPAIR PERINEUM SKIN, EXTERNAL APPROACH: ICD-10-PCS | Performed by: ADVANCED PRACTICE MIDWIFE

## 2021-05-01 PROCEDURE — 10907ZC DRAINAGE OF AMNIOTIC FLUID, THERAPEUTIC FROM PRODUCTS OF CONCEPTION, VIA NATURAL OR ARTIFICIAL OPENING: ICD-10-PCS | Performed by: ADVANCED PRACTICE MIDWIFE

## 2021-05-01 PROCEDURE — U0002 COVID-19 LAB TEST NON-CDC: HCPCS

## 2021-05-01 NOTE — PCM.DEL
L & D Note





- General Info


Date of Service: 21 (childbirth)


Mother's Due Date: 21





- Delivery Note


Labor: Spontaneous


Delivery Outcome: Livebirth


Infant Delivery Method: Spontaneous Vaginal Delivery-Single


Infant Delivery Mode: Spontaneous


Birth Presentation: Vertex


Nuchal Cord: None


Anesthesia Type: Local


Anesthetic: Lidocaine (Xylocaine) 1% Plain


Local Anesthetic Volume: 4cc


Amniotic Fluid Description: Clear


Episiotomy Type: None


Laceration: 1st Degree, Perineal


Suture type: Vicryl


Suture size: 3-0


Placenta: Intact, Spontaneous


Cord: 3 Vessels


Estimated Blood Loss: 100


Resuscitation Needed: No


[place]: Stimulated, Warmed, [place] Used


 Provider: Connie Helton


APGAR Score 1 min: 9 (color)


APGAR Score 5 min: 9 (color)


Second Stage Interventions: Reports: Second Nurse Reviewed Fetal Heart Tones, 

Pushing Effectively


Delivery Comments (Free Text/Narrative):: 


21


This 30 year old G8 now P3 who is 39 4/7 weeks delivered a viable male infant at

0245 via  in ROT position. Rita progressed quickly to completed. AROM and 

complete at 0242. unmedicated birth,  to mother's chest spontaneous 

crying. Active management of the third stage. The placenta delivered very 

quickly after the baby. The cord was double clamped and cut to delivery 

placenta. Apgars of 9 and 9 all for color. No nuchal cord, three vessel cord. 

Small 1 degree perineal tear repaired. 1% lidocaine was used as the local agent.

4, 3-0 Vicryl sutures were used to repair the tear. hemostasis achieved. No 

other lacerations were found of the cervix, vagina, or rectum. 


EBl 100cc


Mother and baby to post partum,


first stage 8035-9279


second stage 7073-5220


third stage 7463-9670


weight 7-7








- General Info


Date of Service: 21


Admission Dx/Problem (Free Text): 


                   Patient Status Order with Admit Dx/Problem





21 02:14


Patient Status [ADT] Routine 








                           Admission Diagnosis/Problem











Admission Diagnosis/Problem    Pregnancy














Functional Status: Reports: Pain Controlled





- Review of Systems


General: Reports: No Symptoms


HEENT: Reports: No Symptoms


Pulmonary: Reports: No Symptoms


Cardiovascular: Reports: No Symptoms


Gastrointestinal: Reports: No Symptoms


Genitourinary: Reports: No Symptoms


Musculoskeletal: Reports: No Symptoms


Skin: Reports: No Symptoms


Neurological: Reports: No Symptoms


Psychiatric: Reports: No Symptoms





- Patient Data


Lab Results Last 24 Hours: 


                         Laboratory Results - last 24 hr











  21 Range/Units





  00:01 01:59 01:59 


 


WBC   13.0 H   (4.5-11.0)  K/uL


 


RBC   4.75   (3.30-5.50)  M/uL


 


Hgb   13.8   (12.0-15.0)  g/dL


 


Hct   41.6   (36.0-48.0)  %


 


MCV   88   (80-98)  fL


 


MCH   29   (27-31)  pg


 


MCHC   33   (32-36)  %


 


Plt Count   231   (150-400)  K/uL


 


Urine Opiates Screen  Negative    (NEGATIVE)  


 


Ur Oxycodone Screen  Negative    (NEGATIVE)  


 


Urine Methadone Screen  Negative    (NEGATIVE)  


 


Ur Propoxyphene Screen  Negative    (NEGATIVE)  


 


Ur Barbiturates Screen  Negative    (NEGATIVE)  


 


Ur Tricyclics Screen  Negative    (NEGATIVE)  


 


Ur Phencyclidine Scrn  Negative    (NEGATIVE)  


 


Ur Amphetamine Screen  Negative    (NEGATIVE)  


 


U Methamphetamines Scrn  Negative    (NEGATIVE)  


 


Urine MDMA Screen  Negative    (NEGATIVE)  


 


U Benzodiazepines Scrn  Negative    (NEGATIVE)  


 


U Cocaine Metab Screen  Negative    (NEGATIVE)  


 


U Marijuana (THC) Screen  Negative    (NEGATIVE)  


 


SARS CoV-2 RNA Rapid NANCY    Negative  











Med Orders - Current: 


                               Current Medications





Acetaminophen (Acetaminophen 325 Mg Tab, 50 Tab Bulk Bottle)  0 mg PO Q4H PRN


   PRN Reason: Pain


Benzocaine (Benzocaine 20% Top Spray 56 Gm Bottle)  0 gm TOP Q4H ONE


   Stop: 21 03:14


Diphenhydramine HCl (Diphenhydramine 50 Mg/Ml Sdv)  25 mg IVPUSH Q6H PRN


   PRN Reason: Itching


Diphenhydramine HCl (Diphenhydramine 50 Mg/Ml Sdv)  50 mg IVPUSH Q6H PRN


   PRN Reason: Itching


Emollient Ointment (Lanolin 100% Cream 40 Gm Tube)  1 gm TOP ASDIRECTED ONE


   Stop: 21 03:14


Ephedrine Sulfate (Ephedrine 50 Mg/Ml Sdv)  10 mg IVPUSH ASDIRECTED PRN


   PRN Reason: Hypotension


Hydrocortisone (Hydrocortisone 2.5% Crm 30 Gm Tube)  1 gm TOP ASDIRECTED PRN


   PRN Reason: Itching


Ropivacaine 200 mg/ Premix  100 mls @ 0 mls/hr EPIDUR ASDIRECTED DAVEY


Ibuprofen (Ibuprofen 200 Mg Tab, 24 Tab Bulk Bottle)  600 mg PO Q6H PRN


   PRN Reason: Pain


Naloxone HCl (Naloxone 0.4 Mg/Ml Sdv)  0.1 mg IVPUSH ASDIRECTED PRN


   PRN Reason: Oversedation


Sodium Chloride (Sodium Chloride 0.9% 10 Ml Syringe)  10 ml FLUSH ASDIRECTED PRN


   PRN Reason: Keep Vein Open


Sodium Chloride (Sodium Chloride 0.9% 10 Ml Syringe)  10 ml FLUSH ASDIRECTED PRN


   PRN Reason: Keep Vein Open


Witch Hazel (Witch Hazel Medicated Pads 100/Jar)  1 pad TOP ASDIRECTED ONE


   Stop: 21 03:14





Discontinued Medications





Ephedrine Sulfate (Ephedrine 50 Mg/Ml Sdv)  10 mg IVPUSH ASDIRECTED PRN


   PRN Reason: Hypotension


Lactated Ringer's (Ringers, Lactated)  1,000 mls @ 999 mls/hr IV BOLUS ONE


   Stop: 21 02:58


   Last Admin: 21 01:58 Dose:  999 mls/hr


   Documented by: 


Oxytocin/Sodium Chloride (Pitocin In Ns 20 Units/1,000 Ml) Confirm Administered 

Dose 20 unit in 1,000 mls @ as directed .ROUTE .STK-MED ONE


   Stop: 21 02:08


Lactated Ringer's (Ringers, Lactated)  1,000 mls @ 999 mls/hr IV .BOLUS ONE


   Stop: 21 03:14


   Last Admin: 21 02:28 Dose:  Not Given


   Documented by: 


Lidocaine HCl (Lidocaine 1% 50 Ml Mdv) Confirm Administered Dose 100 ml .ROUTE 

.STK-MED ONE


   Stop: 21 02:08


Naloxone HCl (Naloxone 0.4 Mg/Ml Sdv) Confirm Administered Dose 0.4 mg .ROUTE 

.STK-MED ONE


   Stop: 21 02:08











- Exam


General: Alert, Oriented


HEENT: Pupils Equal


Neck: Supple


Lungs: Normal Respiratory Effort


Cardiovascular: Regular Rate


GI/Abdominal Exam: Soft, Non-Tender


 (Female) Exam: Normal External Exam, Cervical Dilatation, Enlarged Uterus, 

Vaginal Bleeding


Back Exam: Normal Inspection


Extremities: No Pedal Edema, Normal Capillary Refill


Skin: Warm


Neurological: No New Focal Deficit


Psy/Mental Status: Alert, Normal Affect, Normal Mood





- Problem List & Annotations


(1) Active labor at term


SNOMED Code(s): 81078850


   Code(s): JSU2295 -    Status: Acute   Current Visit: Yes   





(2) Vaginal delivery


SNOMED Code(s): 286264133


   Code(s): O80 - ENCOUNTER FOR FULL-TERM UNCOMPLICATED DELIVERY   Status: Acute

   Current Visit: Yes   





(3) Vaginal tear resulting from childbirth


SNOMED Code(s): 809370373


   Code(s): O71.4 - OBSTETRIC HIGH VAGINAL LACERATION ALONE   Status: Acute   

Current Visit: Yes   





(4) Breastfeeding (infant)


SNOMED Code(s): 156712741


   Code(s): Z78.9 - OTHER SPECIFIED HEALTH STATUS   Status: Acute   Current 

Visit: No   





- Problem List Review


Problem List Initiated/Reviewed/Updated: Yes





- My Orders


Last 24 Hours: 


My Active Orders





21 01:42


OB Check [OM.PC] Click to Edit 





21 01:58


Naloxone [Narcan]   0.1 mg IVPUSH ASDIRECTED PRN 


Sodium Chloride 0.9% [Saline Flush]   10 ml FLUSH ASDIRECTED PRN 


diphenhydrAMINE [Benadryl]   25 mg IVPUSH Q6H PRN 


diphenhydrAMINE [Benadryl]   50 mg IVPUSH Q6H PRN 





21 01:59


Communication Order [RC] ROUTINE 


Oxygen Therapy [RC] ASDIRECTED 


PCEA Epidural [RC] ASDIRECTED 


PCEA Epidural [RC] ASDIRECTED 


Peripheral IV Care [RC] .AS DIRECTED 


Pulse Oximetry [RC] ASDIRECTED 


Vital Signs [RC] PER UNIT ROUTINE 


Epidural Catheter Management [OM.PC] Routine 


Peripheral IV Insertion Pediatric [OM.PC] Routine 





21 02:00


Ropivacaine [Naropin 0.2%] 200 mg   Premix Bag 1 bag EPIDUR ASDIRECTED 





21 02:14


Communication Order [RC] ASDIRECTED 


Communication Order [RC] ASDIRECTED 


Fetal Heart Tones [RC] PER UNIT ROUTINE 


Fetal Non Stress Test [RC] Click to Edit 


Local Anesthetic Infusion Pump [RC] ASDIRECTED 


Notify Provider Vital Signs [RC] PRN 


Notify Provider [RC] PRN 


PCEA Epidural [RC] ASDIRECTED 


Vital Signs [RC] PER UNIT ROUTINE 


Sodium Chloride 0.9% [Saline Flush]   10 ml FLUSH ASDIRECTED PRN 


ePHEDrine [ePHEDrine sulfate]   10 mg IVPUSH ASDIRECTED PRN 


DVT/VTE Prophylaxis Reflex [OM.PC] Routine 


Epidural Catheter Management [OM.PC] Urgent 


Saline Lock Insert [OM.PC] Routine 


Resuscitation Status Routine 





21 02:15


Insert Urinary Catheter [OM.PC] ASDIRECTED 


PCEA Epidural [RC] ASDIRECTED 


Urinary Catheter Assessment [RC] ASDIRECTED 





21 02:16


Antiembolic Devices [RC] .Routine 


VTE/DVT Education [RC] Click to Edit 





21 03:13


Patient Status [ADT] Routine 


Vital Signs [RC] PFP 


Acetaminophen [Tylenol Bulk Bottle]   See Dose Instructions  PO Q4H PRN 


Benzocaine [Kega-R-Rhyudsd 20% Spray]   See Dose Instructions  TOP Q4H ONE 


Hydrocortisone [Proctozone-HC 2.5% Crm]   1 gm TOP ASDIRECTED PRN 


Ibuprofen [Motrin Bulk Bottle]   600 mg PO Q6H PRN 


Lanolin [Lansinoh HPA]   1 gm TOP ASDIRECTED ONE 


witch hazeL [Tucks]   1 pad TOP ASDIRECTED ONE 





21 05:11


CBC WITH AUTO DIFF [HEME] AM 





21 Breakfast


Regular Diet [DIET] 














- Assessment


Assessment:: 


30 year old  39 4/7 weeks delivered via  without complications


repair small 1st degree tear


[place] male, normal


breastfeeding








- Plan


Plan:: 





21


30 year old  39 4/7 weeks


active labor at term


No complications with pregnancy


requesting an epidural





Plan


Anticipate a vaginal delivery


[facility] called for epidural


------------------------------


21


routine cares


support breastfeeding


24-48 hour stay


cbc in am

## 2021-05-01 NOTE — PCM.LDHP
L&D History of Present Illness





- General


Date of Service: 21


Admit Problem/Dx: 


                   Patient Status Order with Admit Dx/Problem





21 02:14


Patient Status [ADT] Routine 








                           Admission Diagnosis/Problem











Admission Diagnosis/Problem    Pregnancy














Source of Information: Patient


History Limitations: Reports: No Limitations





- History of Present Illness


Timing/Duration: Reports: minutes: (1-2), constant/continuous


Severity: Severe


Improves with: Reports: None


Worsens with: Reports: None





- Related Data


Allergies/Adverse Reactions: 


                                    Allergies











Allergy/AdvReac Type Severity Reaction Status Date / Time


 


latex Allergy  Rash Verified 21 11:25











Home Medications: 


                                    Home Meds





Pnv No.95/Ferrous Fum/Folic AC [Prenatal Tablet] 1 tab PO DAILY 19 

[History]


hydrOXYzine HCL [hydrOXYzine] 1 tab PO TID PRN 21 [History]











Past Medical History





- Past Health History


Medical/Surgical History: Denies Medical/Surgical History


HEENT History: Reports: None


Cardiovascular History: Reports: None


Respiratory History: Reports: None


Gastrointestinal History: Reports: None


Genitourinary History: Reports: None


OB/GYN History: Reports: Pregnancy


: 8


Para: 2


LMP (Approximate): Pregnant (NENA 21)


Musculoskeletal History: Reports: Fracture


Other Musculoskeletal History: foot


Neurological History: Reports: Concussion


Psychiatric History: Reports: None


Endocrine/Metabolic History: Reports: None


Hematologic History: Reports: None


Immunologic History: Reports: None


Oncologic (Cancer) History: Reports: None


Dermatologic History: Reports: None





- Past Surgical History


Head Surgeries/Procedures: Reports: None





Social & Family History





- Family History


OBGYN: Reports: Other (See Below)


Other OBGYN Family History: Mother had cervical cancer





- Caffeine Use


Caffeine Use: Reports: Energy Drinks, Soda





H&P Review of Systems





- Review of Systems:


Review Of Systems: See Below


General: Reports: No Symptoms


HEENT: Reports: No Symptoms


Pulmonary: Reports: No Symptoms


Cardiovascular: Reports: No Symptoms


Gastrointestinal: Reports: No Symptoms


Genitourinary: Reports: No Symptoms


Musculoskeletal: Reports: No Symptoms


Skin: Reports: No Symptoms


Psychiatric: Reports: No Symptoms


Neurological: Reports: No Symptoms


Hematologic/Lymphatic: Reports: No Symptoms


Immunologic: Reports: No Symptoms





L&D Exam





- Exam


Exam: See Below





- OB Specific


Contraction Intensity: Strong


Fetal Movement: Active


Fetal Heart Tones: Present


Fetal Heart Tones per Min: 135


Fetal Heart Rate (FHR) Variability: Moderate (6-25 bmp)


Birth Presentation: Vertex


Estimated Fetal Weight: 7 pounds





- Layne Score


Layne Score Cervix Position: Anterior


Layne Score Consistency: Soft


Layne Score Effacement: >80%


Layne Score Dilation: > 5 cm


Layne Score Infant's Station: -1 ,0


Layne Score Total: 12





- Exam


General: Alert, Oriented


HEENT: PERRLA


Neck: Supple


Lungs: Normal Respiratory Effort


Cardiovascular: Regular Rate


GI/Abdominal Exam: Normal Bowel Sounds


Genitourinary: Normal external exam


Back Exam: Normal Inspection


Extremities: Normal Inspection, No Pedal Edema


Skin: Warm


Neurological: Cranial Nerves Intact


Psychiatric: Alert, Normal Affect, Normal Mood





- Patient Data


Lab Results Last 24 hrs: 


                         Laboratory Results - last 24 hr











  21 Range/Units





  01:59 


 


WBC  13.0 H  (4.5-11.0)  K/uL


 


RBC  4.75  (3.30-5.50)  M/uL


 


Hgb  13.8  (12.0-15.0)  g/dL


 


Hct  41.6  (36.0-48.0)  %


 


MCV  88  (80-98)  fL


 


MCH  29  (27-31)  pg


 


MCHC  33  (32-36)  %


 


Plt Count  231  (150-400)  K/uL











Result Diagrams: 


                                 21 01:59








- Problem List


(1) Active labor at term


SNOMED Code(s): 41420658


   ICD Code: [id number] -    Status: Acute   Current Visit: Yes   


Problem List Initiated/Reviewed/Updated: Yes


Orders Last 24hrs: 


                               Active Orders 24 hr











 Category Date Time Status


 


 Patient Status [ADT] Routine ADT  21 02:14 Ordered


 


 Antiembolic Devices [RC] .Routine Care  21 02:16 Ordered


 


 Communication Order [RC] ASDIRECTED Care  21 02:14 Ordered


 


 Communication Order [RC] ASDIRECTED Care  21 02:14 Ordered


 


 Communication Order [RC] ROUTINE Care  21 01:59 Active


 


 Communication Order [RC] ROUTINE Care  21 01:59 Active


 


 Communication Order [RC] ROUTINE Care  21 01:59 Active


 


 Fetal Heart Tones [RC] PER UNIT ROUTINE Care  21 02:14 Ordered


 


 Fetal Non Stress Test [RC] Click to Edit Care  21 02:14 Ordered


 


 Insert Urinary Catheter [OM.PC] ASDIRECTED Care  21 02:15 Ordered


 


 Local Anesthetic Infusion Pump [RC] ASDIRECTED Care  21 02:14 Ordered


 


 Notify Provider Vital Signs [RC] PRN Care  21 02:14 Ordered


 


 Notify Provider [RC] PRN Care  21 02:14 Ordered


 


 OB Check [OM.PC] Click to Edit Care  21 01:42 Ordered


 


 Oxygen Therapy [RC] ASDIRECTED Care  21 01:59 Active


 


 PCEA Epidural [RC] ASDIRECTED Care  21 01:59 Active


 


 PCEA Epidural [RC] ASDIRECTED Care  21 01:59 Active


 


 PCEA Epidural [RC] ASDIRECTED Care  21 02:14 Ordered


 


 PCEA Epidural [RC] ASDIRECTED Care  21 02:15 Ordered


 


 Peripheral IV Care [RC] .AS DIRECTED Care  21 01:59 Active


 


 Pulse Oximetry [RC] ASDIRECTED Care  21 01:59 Active


 


 Urinary Catheter Assessment [RC] ASDIRECTED Care  21 02:15 Ordered


 


 VTE/DVT Education [RC] Click to Edit Care  21 02:16 Ordered


 


 Vital Signs [RC] PER UNIT ROUTINE Care  21 01:59 Active


 


 Vital Signs [RC] PER UNIT ROUTINE Care  21 02:14 Ordered


 


 CORONAVIRUS COVID-19 RAPID [MOLEC] Routine Lab  21 01:59 Received


 


 DRUG SCREEN, URINE [URCHEM] Routine Lab  21 01:49 Ordered


 


 Lactated Ringers [Ringers, Lactated] 1,000 ml Med  21 02:14 Ordered





 IV .BOLUS   


 


 Lactated Ringers [Ringers, Lactated] 1,000 ml Med  21 01:58 Active





 IV BOLUS   


 


 Naloxone [Narcan] Med  21 01:58 Active





 0.1 mg IVPUSH ASDIRECTED PRN   


 


 Ropivacaine [Naropin 0.2%] 200 mg Med  21 02:00 Active





 Premix Bag 1 bag   





 EPIDUR ASDIRECTED   


 


 Sodium Chloride 0.9% [Saline Flush] Med  21 01:58 Active





 10 ml FLUSH ASDIRECTED PRN   


 


 Sodium Chloride 0.9% [Saline Flush] Med  21 02:14 Ordered





 10 ml FLUSH ASDIRECTED PRN   


 


 diphenhydrAMINE [Benadryl] Med  21 01:58 Active





 25 mg IVPUSH Q6H PRN   


 


 diphenhydrAMINE [Benadryl] Med  21 01:58 Active





 50 mg IVPUSH Q6H PRN   


 


 ePHEDrine [ePHEDrine sulfate] Med  21 01:58 Active





 10 mg IVPUSH ASDIRECTED PRN   


 


 ePHEDrine [ePHEDrine sulfate] Med  21 02:14 Ordered





 10 mg IVPUSH ASDIRECTED PRN   


 


 DVT/VTE Prophylaxis Reflex [OM.PC] Routine Oth  21 02:14 Ordered


 


 Epidural Catheter Management [OM.PC] Routine Oth  21 01:59 Ordered


 


 Epidural Catheter Management [OM.PC] Urgent Oth  21 02:14 Ordered


 


 Peripheral IV Insertion Pediatric [OM.PC] Routine Oth  21 01:59 Ordered


 


 Saline Lock Insert [OM.PC] Routine Oth  21 02:14 Ordered


 


 Resuscitation Status Routine Resus Stat  21 02:14 Ordered








                                Medication Orders





Diphenhydramine HCl (Diphenhydramine 50 Mg/Ml Sdv)  25 mg IVPUSH Q6H PRN


   PRN Reason: Itching


Diphenhydramine HCl (Diphenhydramine 50 Mg/Ml Sdv)  50 mg IVPUSH Q6H PRN


   PRN Reason: Itching


Ephedrine Sulfate (Ephedrine 50 Mg/Ml Sdv)  10 mg IVPUSH ASDIRECTED PRN


   PRN Reason: Hypotension


Lactated Ringer's (Ringers, Lactated)  1,000 mls @ 999 mls/hr IV BOLUS ONE


   Stop: 21 02:58


   Last Admin: 21 01:58  Dose: 999 mls/hr


   Documented by: PINOLAU


Ropivacaine 200 mg/ Premix  100 mls @ 0 mls/hr EPIDUR ASDIRECTED DAVEY


Naloxone HCl (Naloxone 0.4 Mg/Ml Sdv)  0.1 mg IVPUSH ASDIRECTED PRN


   PRN Reason: Oversedation


Sodium Chloride (Sodium Chloride 0.9% 10 Ml Syringe)  10 ml FLUSH ASDIRECTED PRN


   PRN Reason: Keep Vein Open








Assessment/Plan Comment:: 





21


30 year old  39 4/7 weeks


active labor at term


No complications with pregnancy


requesting an epidural





Plan


Anticipate a vaginal delivery


[facility] called for epidural

## 2021-05-02 VITALS — SYSTOLIC BLOOD PRESSURE: 110 MMHG | DIASTOLIC BLOOD PRESSURE: 68 MMHG | HEART RATE: 76 BPM

## 2021-05-02 NOTE — PCM.PNPP
- General Info


Date of Service: 21 (PPD 1 D/C)


Admission Dx/Problem (Free Text): 


                   Patient Status Order with Admit Dx/Problem





21 02:14


Patient Status [ADT] Routine 








                           Admission Diagnosis/Problem











Admission Diagnosis/Problem    Pregnancy














Functional Status: Reports: Pain Controlled





- Review of Systems


General: Reports: No Symptoms


HEENT: Reports: No Symptoms


Pulmonary: Reports: No Symptoms


Cardiovascular: Reports: No Symptoms


Gastrointestinal: Reports: No Symptoms


Genitourinary: Reports: No Symptoms


Musculoskeletal: Reports: No Symptoms


Skin: Reports: No Symptoms


Neurological: Reports: No Symptoms


Psychiatric: Reports: No Symptoms





- Patient Data


Vital Signs - Most Recent: 


                                Last Vital Signs











Temp  96.3 F L  21 04:00


 


Pulse  77   21 04:00


 


Resp  16   21 04:00


 


BP  108/79   21 04:00


 


Pulse Ox  97   21 04:00











Weight - Most Recent: 240 lb


I&O - Last 24 Hours: 


                                 Intake & Output











 21





 22:59 06:59 14:59


 


Intake Total 1000 1000 


 


Balance 1000 1000 











Med Orders - Current: 


                               Current Medications





Acetaminophen (Acetaminophen 325 Mg Tab, 50 Tab Bulk Bottle)  0 mg PO Q4H PRN


   PRN Reason: Pain


   Last Admin: 21 03:41 Dose:  1 bottle


   Documented by: 


Benzocaine (Benzocaine 20% Top Spray 56 Gm Bottle)  0 gm TOP Q4H PRN


   PRN Reason: PAIN


Emollient Ointment (Lanolin 100% Cream 40 Gm Tube)  1 gm TOP ASDIRECTED PRN


   PRN Reason: NIPPLE PAIN


Hydrocortisone (Hydrocortisone 2.5% Crm 30 Gm Tube)  1 gm TOP ASDIRECTED PRN


   PRN Reason: Itching


Ibuprofen (Ibuprofen 200 Mg Tab, 24 Tab Bulk Bottle)  600 mg PO Q6H PRN


   PRN Reason: Pain


   Last Admin: 21 03:40 Dose:  1 bottle


   Documented by: 


Sodium Chloride (Sodium Chloride 0.9% 10 Ml Syringe)  10 ml FLUSH ASDIRECTED PRN


   PRN Reason: Keep Vein Open


Witch Hazel (Witch Hazel Medicated Pads 100/Jar)  1 pad TOP ASDIRECTED PRN


   PRN Reason: HEMMHOIRDS





Discontinued Medications





Benzocaine (Benzocaine 20% Top Spray 56 Gm Bottle)  0 gm TOP Q4H ONE


   Stop: 21 03:14


   Last Admin: 21 03:37 Dose:  1 bottle


   Documented by: 


Diphenhydramine HCl (Diphenhydramine 50 Mg/Ml Sdv)  25 mg IVPUSH Q6H PRN


   PRN Reason: Itching


Diphenhydramine HCl (Diphenhydramine 50 Mg/Ml Sdv)  50 mg IVPUSH Q6H PRN


   PRN Reason: Itching


Emollient Ointment (Lanolin 100% Cream 40 Gm Tube)  1 gm TOP ASDIRECTED ONE


   Stop: 21 03:14


   Last Admin: 21 03:37 Dose:  1 tube


   Documented by: 


Ephedrine Sulfate (Ephedrine 50 Mg/Ml Sdv)  10 mg IVPUSH ASDIRECTED PRN


   PRN Reason: Hypotension


Ephedrine Sulfate (Ephedrine 50 Mg/Ml Sdv)  10 mg IVPUSH ASDIRECTED PRN


   PRN Reason: Hypotension


Lactated Ringer's (Ringers, Lactated)  1,000 mls @ 999 mls/hr IV BOLUS ONE


   Stop: 21 02:58


   Last Admin: 21 01:58 Dose:  999 mls/hr


   Documented by: 


Ropivacaine 200 mg/ Premix  100 mls @ 0 mls/hr EPIDUR ASDIRECTED DAVEY


Oxytocin/Sodium Chloride (Pitocin In Ns 20 Units/1,000 Ml) Confirm Administered 

Dose 20 unit in 1,000 mls @ as directed .ROUTE .STK-MED ONE


   Stop: 21 02:08


   Last Admin: 21 03:30 Dose:  Not Given


   Documented by: 


Lactated Ringer's (Ringers, Lactated)  1,000 mls @ 999 mls/hr IV .BOLUS ONE


   Stop: 21 03:14


   Last Admin: 21 02:28 Dose:  Not Given


   Documented by: 


Oxytocin/Sodium Chloride (Pitocin In Ns 20 Units/1,000 Ml)  20 unit in 1,000 mls

 @ 999 mls/hr IV TITRATE ADVEY; Protocol


   Last Admin: 21 03:41 Dose:  999 mls/hr, 999 mls/hr


   Documented by: 


Lidocaine HCl (Lidocaine 1% 50 Ml Mdv) Confirm Administered Dose 100 ml .ROUTE 

.STK-MED ONE


   Stop: 21 02:08


   Last Admin: 21 03:30 Dose:  Not Given


   Documented by: 


Lidocaine HCl (Lidocaine 1% 50 Ml Mdv)  100 ml INJECT ONETIME ONE


   Stop: 21 03:31


   Last Admin: 21 03:40 Dose:  50 ml


   Documented by: 


Naloxone HCl (Naloxone 0.4 Mg/Ml Sdv)  0.1 mg IVPUSH ASDIRECTED PRN


   PRN Reason: Oversedation


Naloxone HCl (Naloxone 0.4 Mg/Ml Sdv) Confirm Administered Dose 0.4 mg .ROUTE 

.STK-MED ONE


   Stop: 21 02:08


   Last Admin: 21 03:30 Dose:  Not Given


   Documented by: 


Sodium Chloride (Sodium Chloride 0.9% 10 Ml Syringe)  10 ml FLUSH ASDIRECTED PRN


   PRN Reason: Keep Vein Open


Witch Hazel (Witch Hazel Medicated Pads 100/Jar)  1 pad TOP ASDIRECTED ONE


   Stop: 21 03:14


   Last Admin: 21 03:37 Dose:  1 pad


   Documented by: 











- Infant Interaction


Infant Disposition, Postpartum: [place] in Room with Family


Infant Interaction: Holding Infant


Infant Feeding: Continues to Breastfeed, Difficulty with Latch-on


Support Person: Significant Other





- Postpartum Recovery Exam


Fundal Tone: Firm


Fundal Level: 1 Fingerbreadths Below Umbilicus


Fundal Placement: Midline


Lochia Amount: Moderate


Lochia Color: Rubra/Red


Perineum Description: Intact, Minimal Bruising/Swelling, Other (see below) 

(digital exam, repair healing nicely)


Episiotomy/Laceration: Approximated


Bladder Status: Voiding


Urinary Elimination: Voided





- Exam


General: Alert, Oriented


HEENT: Pupils Equal


Neck: Supple


Lungs: Normal Respiratory Effort


Cardiovascular: Regular Rate


GI/Abdominal Exam: Soft


Extremities: Normal Inspection, No Pedal Edema, Normal Capillary Refill


Skin: Warm, Intact


Wound/Incisions: Healing Well


Neurological: No New Focal Deficit


Psy/Mental Status: Alert, Normal Affect, Normal Mood





- Problem List & Annotations


(1) Active labor at term


SNOMED Code(s): 06822819


   Code(s): MUV0723 -    Status: Acute   Current Visit: Yes   





(2) Vaginal delivery


SNOMED Code(s): 717056344


   Code(s): O80 - ENCOUNTER FOR FULL-TERM UNCOMPLICATED DELIVERY   Status: Acute

   Current Visit: Yes   





(3) Vaginal tear resulting from childbirth


SNOMED Code(s): 458380672


   Code(s): O71.4 - OBSTETRIC HIGH VAGINAL LACERATION ALONE   Status: Acute   

Current Visit: Yes   





(4) Breastfeeding (infant)


SNOMED Code(s): 100024582


   Code(s): Z78.9 - OTHER SPECIFIED HEALTH STATUS   Status: Acute   Current 

Visit: No   





- Problem List Review


Problem List Initiated/Reviewed/Updated: Yes





- Assessment


Assessment:: 


30 year old  39 4/7 weeks delivered via  without complications


repair small 1st degree tear


 male, normal


breastfeeding


-----------------------------


21


30 year old  39 4/7 weeks 


doing well


Happy


breast soft


flow light and cramping moderate


repair intact


no edema in legs


hgb 12.6








- Plan


Plan:: 





21


30 year old  39 4/7 weeks


active labor at term


No complications with pregnancy


requesting an epidural





Plan


Anticipate a vaginal delivery


[facility] called for epidural


------------------------------


21


routine cares


support breastfeeding


24-48 hour stay


cbc in am


-------------------


21


Home today


See RITA Cedeno in 6 weeks for a post partum visit

## 2021-12-11 ENCOUNTER — HOSPITAL ENCOUNTER (EMERGENCY)
Dept: HOSPITAL 11 - JP.ED | Age: 31
Discharge: HOME | End: 2021-12-11
Payer: MEDICAID

## 2021-12-11 VITALS — SYSTOLIC BLOOD PRESSURE: 117 MMHG | HEART RATE: 113 BPM | DIASTOLIC BLOOD PRESSURE: 67 MMHG

## 2021-12-11 DIAGNOSIS — Z91.040: ICD-10-CM

## 2021-12-11 DIAGNOSIS — L03.113: Primary | ICD-10-CM

## 2021-12-11 PROCEDURE — 99283 EMERGENCY DEPT VISIT LOW MDM: CPT

## 2021-12-11 PROCEDURE — 96372 THER/PROPH/DIAG INJ SC/IM: CPT

## 2021-12-11 NOTE — EDM.PDOC
ED HPI GENERAL MEDICAL PROBLEM





- General


Chief Complaint: Skin Complaint


Stated Complaint: NEW TATTOO INFECTED


Time Seen by Provider: 12/11/21 13:35


Source of Information: Reports: Patient


History Limitations: Reports: No Limitations





- History of Present Illness


INITIAL COMMENTS - FREE TEXT/NARRATIVE: 





31-year-old female got a fairly large tattoo on her right arm, she also had a 

tattoo on her left wrist and a repair of a tattoo on her left arm.  The tattoo 

on her right arm is very inflamed today, red, hot, and she had shaking chills 

overnight.  She is a little concerned about infection because the  

was "on his phone a lot" so she is unsure if he stayed sterile.  No nausea or 

vomiting.  No other complaints at this time.


Onset: Gradual


Duration: Hour(s): (Symptoms for 12 hours)


Location: Reports: Upper Extremity, Right


Associated Symptoms: Reports: Fever/Chills


  ** Right Upper Arm


Pain Score (Numeric/FACES): 10





- Related Data


                                    Allergies











Allergy/AdvReac Type Severity Reaction Status Date / Time


 


latex Allergy  Rash Verified 04/25/21 11:25











Home Meds: 


                                    Home Meds





Pnv No.95/Ferrous Fum/Folic AC [Prenatal Tablet] 1 tab PO DAILY 01/16/19 

[History]


hydrOXYzine HCL [hydrOXYzine] 1 tab PO TID PRN 04/25/21 [History]











Past Medical History





- Past Health History


Medical/Surgical History: Denies Medical/Surgical History


HEENT History: Reports: None


Cardiovascular History: Reports: None


Respiratory History: Reports: None


Gastrointestinal History: Reports: None


Genitourinary History: Reports: None


OB/GYN History: Reports: Pregnancy


Musculoskeletal History: Reports: Fracture


Other Musculoskeletal History: foot


Neurological History: Reports: Concussion


Psychiatric History: Reports: None


Endocrine/Metabolic History: Reports: None


Hematologic History: Reports: None


Immunologic History: Reports: None


Oncologic (Cancer) History: Reports: None


Dermatologic History: Reports: None





- Past Surgical History


Head Surgeries/Procedures: Reports: None





Social & Family History





- Family History


Family Medical History: No Pertinent Family History


OBGYN: Reports: Other (See Below)


Other OBGYN Family History: Mother had cervical cancer





- Tobacco Use


Tobacco Use Status *Q: Never Tobacco User





- Caffeine Use


Caffeine Use: Reports: Soda





- Recreational Drug Use


Recreational Drug Use: No





ED ROS GENERAL





- Review of Systems


Review Of Systems: See Below


Constitutional: Reports: Fever, Chills, Malaise


HEENT: Reports: No Symptoms


Respiratory: Reports: No Symptoms


Cardiovascular: Reports: No Symptoms


GI/Abdominal: Reports: No Symptoms


Skin: Reports: Other (Marked erythema, itching and tenderness around her large 

right tattoo)





ED EXAM, SKIN/RASH


Exam: See Below


Exam Limited By: No Limitations


General Appearance: Alert, No Apparent Distress, Other (Looks uncomfortable but 

not distressed)


Eye Exam: Bilateral Eye: Normal Inspection


Head: Atraumatic


Respiratory/Chest: No Respiratory Distress, Lungs Clear


Cardiovascular: Regular Rate, Rhythm, Tachycardia (Mild tachycardia)


Extremities: Other (The right lateral shoulder has a large multicolored tattoo 

with intense sharply demarcated erythema around the tattoo with soft tissue 

swelling and warmth)


Neurological: Alert, Oriented





Course





- Vital Signs


Last Recorded V/S: 


                                Last Vital Signs











Temp  98.2 F   12/11/21 13:42


 


Pulse  113 H  12/11/21 13:42


 


Resp  16   12/11/21 13:42


 


BP  117/67   12/11/21 13:42


 


Pulse Ox  98   12/11/21 13:42














- Orders/Labs/Meds


Meds: 


Medications














Discontinued Medications














Generic Name Dose Route Start Last Admin





  Trade Name Indigo  PRN Reason Stop Dose Admin


 


Methylprednisolone Sodium Succinate  125 mg  12/11/21 13:51  12/11/21 14:19





  Methylprednisolone Sodium Succinate 125 Mg/2 Ml Sdv  IM  12/11/21 13:52  125 

mg





  ONETIME ONE   Administration














- Re-Assessments/Exams


Free Text/Narrative Re-Assessment/Exam: 





12/11/21 13:55


I explained that this is early for bacterial cellulitis but the possibility is 

present.  I gave her 125 mg of IM Solu-Medrol for an acute immunologic 

inflammatory reaction, and started her on cephalexin 500 mg 3 times a day.  Enc

ouraged her to ice this down aggressively for the next 1 to 2 days and return if

 worsening.





Departure





- Departure


Time of Disposition: 14:29


Disposition: Home, Self-Care 01


Clinical Impression: 


 Cellulitis of right shoulder








- Discharge Information


Instructions:  Cellulitis, Adult


Referrals: 


PCP,None [Primary Care Provider] - 


Forms:  ED Department Discharge


Care Plan Goals: 


Continue icing the area down as needed, and take antibiotic at least 5 days as 

prescribed.  Return if worsening despite treatment such as persistent fever, 

vomiting the medication or increased erythema and pain.





Sepsis Event Note (ED)





- Evaluation


Sepsis Screening Result: No Definite Risk





- Focused Exam


Vital Signs: 


                                   Vital Signs











  Temp Pulse Resp BP Pulse Ox


 


 12/11/21 13:42  98.2 F  113 H  16  117/67  98


 


 12/11/21 13:32  98.2 F  113 H  16  117/67  98

## 2022-01-31 ENCOUNTER — HOSPITAL ENCOUNTER (EMERGENCY)
Dept: HOSPITAL 11 - JP.ED | Age: 32
Discharge: HOME | End: 2022-01-31
Payer: MEDICAID

## 2022-01-31 VITALS — HEART RATE: 108 BPM | SYSTOLIC BLOOD PRESSURE: 96 MMHG | DIASTOLIC BLOOD PRESSURE: 61 MMHG

## 2022-01-31 DIAGNOSIS — S80.211A: ICD-10-CM
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
## 2022-01-31 DIAGNOSIS — S60.212A: ICD-10-CM

## 2022-01-31 DIAGNOSIS — Z91.040: ICD-10-CM

## 2022-01-31 DIAGNOSIS — S80.212A: Primary | ICD-10-CM

## 2022-12-08 ENCOUNTER — HOSPITAL ENCOUNTER (INPATIENT)
Dept: HOSPITAL 11 - JP.OB | Age: 32
LOS: 1 days | Discharge: HOME | End: 2022-12-09
Admitting: FAMILY MEDICINE
Payer: MEDICAID

## 2022-12-08 DIAGNOSIS — Z91.040: ICD-10-CM

## 2022-12-08 DIAGNOSIS — O48.0: Primary | ICD-10-CM

## 2022-12-08 DIAGNOSIS — Z20.822: ICD-10-CM

## 2022-12-08 DIAGNOSIS — Z3A.40: ICD-10-CM

## 2022-12-08 PROCEDURE — U0002 COVID-19 LAB TEST NON-CDC: HCPCS

## 2022-12-08 PROCEDURE — 10907ZC DRAINAGE OF AMNIOTIC FLUID, THERAPEUTIC FROM PRODUCTS OF CONCEPTION, VIA NATURAL OR ARTIFICIAL OPENING: ICD-10-PCS

## 2022-12-09 VITALS — SYSTOLIC BLOOD PRESSURE: 128 MMHG | HEART RATE: 82 BPM | DIASTOLIC BLOOD PRESSURE: 69 MMHG

## 2023-08-02 ENCOUNTER — HOSPITAL ENCOUNTER (EMERGENCY)
Dept: HOSPITAL 11 - JP.ED | Age: 33
Discharge: HOME | End: 2023-08-02
Payer: MEDICAID

## 2023-08-02 VITALS — SYSTOLIC BLOOD PRESSURE: 104 MMHG | HEART RATE: 44 BPM | DIASTOLIC BLOOD PRESSURE: 51 MMHG

## 2023-08-02 DIAGNOSIS — Z91.040: ICD-10-CM

## 2023-08-02 DIAGNOSIS — R09.1: Primary | ICD-10-CM

## 2023-08-02 LAB
ALBUMIN SERPL-MCNC: 3.4 G/DL (ref 3.4–5)
ALBUMIN/GLOB SERPL: 1 {RATIO} (ref 1.2–2.2)
ALP SERPL-CCNC: 84 U/L (ref 46–116)
ALT SERPL-CCNC: 24 U/L (ref 12–78)
AMPHET UR QL SCN: NEGATIVE
AMPHET UR QL SCN: NEGATIVE
ANION GAP SERPL CALC-SCNC: 6.3 MMOL/L (ref 5–14)
APPEARANCE UR: (no result)
AST SERPL-CCNC: 19 U/L (ref 15–37)
BACTERIA URNS QL MICRO: (no result)
BARBITURATES UR QL SCN: NEGATIVE
BASE EXCESS BLDV CALC-SCNC: 1.3 MM/L
BENZODIAZ UR QL SCN: NEGATIVE
BILIRUB SERPL-MCNC: 0.2 MG/DL (ref 0.2–1)
BILIRUB UR STRIP-MCNC: NEGATIVE MG/DL
BNP SERPL-MCNC: 71 PG/ML (ref 5–125)
BUN SERPL-MCNC: 13 MG/DL (ref 7–18)
CALCIUM SERPL-MCNC: 8.9 MG/DL (ref 8.5–10.1)
CHLORIDE SERPL-SCNC: 106 MMOL/L (ref 100–108)
CO2 SERPL-SCNC: 29 MMOL/L (ref 21–32)
COHGB MFR BLDA: 1.9 % (ref 0–1.6)
COLOR UR: YELLOW
CREAT CL 24H UR+SERPL-VRATE: 113.24 ML/MIN
CREAT SERPL-MCNC: 0.7 MG/DL (ref 0.6–1)
EPI CELLS #/AREA URNS HPF: (no result) /[HPF]
GLOBULIN SER-MCNC: 3.5 G/DL (ref 2.3–3.5)
GLUCOSE SERPL-MCNC: 107 MG/DL (ref 74–106)
GLUCOSE UR STRIP-MCNC: NEGATIVE MG/DL
HCO3 BLDV-SCNC: 27.1 MMOL/L
HCT VFR BLD AUTO: 40.1 % (ref 34.3–46)
HGB BLD-MCNC: 13 G/DL (ref 11.2–15.5)
HGB BLDA-MCNC: 13.4 G/DL (ref 12–16)
INR PPP: 1
KETONES UR STRIP-MCNC: NEGATIVE MG/DL
MCH RBC QN AUTO: 28.2 PG (ref 31.6–35.5)
MCHC RBC AUTO-ENTMCNC: 32.4 G/DL (ref 31.6–35.5)
MCHC RBC AUTO-ENTMCNC: 87 FL (ref 81.4–99)
METHADONE UR QL SCN: NEGATIVE
METHGB MFR BLDA: 1.2 %
MUCOUS THREADS URNS QL MICRO: (no result)
NITRITE UR QL: NEGATIVE
OXYCODONE UR QL SCN: NEGATIVE
OXYHGB MFR BLDA: 57.6 %
PCO2 BLDV: 50.2 MM/HG
PH BLDV: 7.35 [PH] (ref 7.35–7.45)
PH UR STRIP: 5.5 [PH] (ref 5–8)
PLATELET # BLD AUTO: 292 K/UL (ref 130–375)
PO2 BLDV: 37.7 MM/HG
POTASSIUM SERPL-SCNC: 4.3 MMOL/L (ref 3.6–5.2)
PROPOXYPH UR QL SCN: NEGATIVE
PROT SERPL-MCNC: 6.9 G/DL (ref 6.4–8.2)
PROT UR STRIP-MCNC: NEGATIVE MG/DL
PROTHROMBIN TIME: 10.1 SEC (ref 9.2–10.6)
RBC # BLD AUTO: 4.61 M/UL (ref 3.77–5.24)
RBC # URNS HPF: (no result) /ML (ref 0–5)
RBC UR QL: (no result)
SAO2 % BLDV: 59.4 %
SODIUM SERPL-SCNC: 141 MMOL/L (ref 140–148)
SP GR UR STRIP: 1.02 (ref 1.01–1.03)
THC UR QL SCN>50 NG/ML: (no result)
UROBILINOGEN UR STRIP-ACNC: 0.2 EU/DL (ref 0.2–1)
WBC # BLD AUTO: 6.3 K/UL (ref 3.2–11)
WBC UR QL: (no result) (ref 0–5)

## 2025-01-18 ENCOUNTER — HOSPITAL ENCOUNTER (EMERGENCY)
Dept: HOSPITAL 11 - JP.ED | Age: 35
Discharge: NURSING, REHAB OR LONG-TERM CARE | End: 2025-01-18
Payer: MEDICAID

## 2025-01-18 VITALS — SYSTOLIC BLOOD PRESSURE: 121 MMHG | DIASTOLIC BLOOD PRESSURE: 69 MMHG | HEART RATE: 91 BPM

## 2025-01-18 DIAGNOSIS — R07.89: Primary | ICD-10-CM

## 2025-01-18 DIAGNOSIS — Z91.040: ICD-10-CM

## 2025-01-18 LAB — COMMENT: (no result)
